# Patient Record
Sex: MALE | URBAN - METROPOLITAN AREA
[De-identification: names, ages, dates, MRNs, and addresses within clinical notes are randomized per-mention and may not be internally consistent; named-entity substitution may affect disease eponyms.]

---

## 2019-09-25 ENCOUNTER — APPOINTMENT (OUTPATIENT)
Dept: URBAN - METROPOLITAN AREA CLINIC 232 | Age: 59
Setting detail: DERMATOLOGY
End: 2019-09-26

## 2019-09-25 DIAGNOSIS — L40.0 PSORIASIS VULGARIS: ICD-10-CM

## 2019-09-25 PROCEDURE — OTHER ADDITIONAL NOTES: OTHER

## 2019-09-25 PROCEDURE — OTHER PPD: OTHER

## 2019-09-25 PROCEDURE — 86580 TB INTRADERMAL TEST: CPT

## 2019-09-25 PROCEDURE — 99214 OFFICE O/P EST MOD 30 MIN: CPT

## 2019-09-25 PROCEDURE — OTHER COUNSELING: OTHER

## 2019-09-25 PROCEDURE — OTHER PRESCRIPTION: OTHER

## 2019-09-25 RX ORDER — CLOBETASOL PROPIONATE 0.5 MG/ML
SOLUTION TOPICAL
Qty: 50 | Refills: 0 | Status: ERX | COMMUNITY
Start: 2019-09-25

## 2019-09-25 ASSESSMENT — LOCATION SIMPLE DESCRIPTION DERM
LOCATION SIMPLE: GENITALIA
LOCATION SIMPLE: RIGHT ELBOW
LOCATION SIMPLE: RIGHT KNEE
LOCATION SIMPLE: LEFT FOREARM
LOCATION SIMPLE: LEFT ELBOW
LOCATION SIMPLE: LEFT HAND
LOCATION SIMPLE: RIGHT UPPER BACK
LOCATION SIMPLE: RIGHT PRETIBIAL REGION
LOCATION SIMPLE: PENIS
LOCATION SIMPLE: RIGHT HAND
LOCATION SIMPLE: LEFT KNEE
LOCATION SIMPLE: POSTERIOR SCALP
LOCATION SIMPLE: LEFT PRETIBIAL REGION

## 2019-09-25 ASSESSMENT — LOCATION ZONE DERM
LOCATION ZONE: PENIS
LOCATION ZONE: ARM
LOCATION ZONE: TRUNK
LOCATION ZONE: SCALP
LOCATION ZONE: HAND
LOCATION ZONE: GENITALIA
LOCATION ZONE: LEG

## 2019-09-25 ASSESSMENT — LOCATION DETAILED DESCRIPTION DERM
LOCATION DETAILED: RIGHT DISTAL PRETIBIAL REGION
LOCATION DETAILED: RIGHT KNEE
LOCATION DETAILED: POSTERIOR MID-PARIETAL SCALP
LOCATION DETAILED: LEFT ULNAR DORSAL HAND
LOCATION DETAILED: LEFT PROXIMAL PRETIBIAL REGION
LOCATION DETAILED: LEFT ELBOW
LOCATION DETAILED: RIGHT ELBOW
LOCATION DETAILED: LEFT KNEE
LOCATION DETAILED: RIGHT DORSAL SHAFT OF PENIS
LOCATION DETAILED: RIGHT MEDIAL UPPER BACK
LOCATION DETAILED: LEFT VENTRAL PROXIMAL FOREARM
LOCATION DETAILED: RIGHT ULNAR DORSAL HAND
LOCATION DETAILED: GENITALIA

## 2019-09-25 NOTE — PROCEDURE: ADDITIONAL NOTES
Additional Notes: Previous tx recap:\\nUVB light at home pt uses on occasion, some improvement\\nH/O daily beer use so no MTX\\nHumira 40mg qowk used [id number] but had some flaring and joint pain remained\\nEnbrel 50mg 2x/wk 8654-9151, eventually flared skin \\nStelara 6924-8764, good results with psoriasis but pt believes caused gout and flared again when re-challenged\\nTaltz 0336-6118 (3mos tx) Pt felt flared gout to d/c'd\\nEnbrel 4/2019 to present, no gout flare but also taking allopurinol however skin flaring-scalp, face, elbows, hands, knees, genitals, feet\\n\\nPt using clobetasol to scalp, trunk, ext and TAC 0.025% cream intermittently.\\n\\nExplained I will discuss with Dr BENNETT and the biologics coordinator to determine next best option based on cost. Ht 5'8\", wt 215lbs\\n\\nPPD placed left ventral forearm. Pt will have read in 2 days by PCP and send results to the office. Additional Notes: Previous tx recap:\\nUVB light at home pt uses on occasion, some improvement\\nH/O daily beer use so no MTX\\nHumira 40mg qowk used 3268-9324 but had some flaring and joint pain remained\\nEnbrel 50mg 2x/wk [id number], eventually flared skin \\nStelara 1183-7055, good results with psoriasis but pt believes caused gout and flared again when re-challenged\\nTaltz 8012-1896 (3mos tx) Pt felt flared gout to d/c'd\\nEnbrel 4/2019 to present, no gout flare but also taking allopurinol however skin flaring-scalp, face, elbows, hands, knees, genitals, feet\\n\\nPt using clobetasol to scalp, trunk, ext and TAC 0.025% cream intermittently.\\n\\nExplained I will discuss with Dr BENNETT and the biologics coordinator to determine next best option based on cost. Ht 5'8\", wt 215lbs\\n\\nPPD placed left ventral forearm. Pt will have read in 2 days by PCP and send results to the office.

## 2019-10-07 ENCOUNTER — APPOINTMENT (OUTPATIENT)
Dept: URBAN - METROPOLITAN AREA CLINIC 232 | Age: 59
Setting detail: DERMATOLOGY
End: 2019-10-07

## 2019-10-07 DIAGNOSIS — L40.0 PSORIASIS VULGARIS: ICD-10-CM

## 2019-10-07 PROCEDURE — OTHER PRESCRIPTION: OTHER

## 2019-10-07 PROCEDURE — OTHER ADDITIONAL NOTES: OTHER

## 2019-10-07 RX ORDER — ETANERCEPT 50 MG/ML
SOLUTION SUBCUTANEOUS
Qty: 4 | Refills: 5 | Status: ERX | COMMUNITY
Start: 2019-10-07

## 2019-10-07 NOTE — PROCEDURE: ADDITIONAL NOTES
Detail Level: Zone
Additional Notes: Pt has had to failure with Enbrel twice weekly and I suspect will be lesser benefit at maintenance dose. Will pursue Skyrizi since it is most likely to be covered or have pt assistance. Await PPD reading to send Skyrizi rx

## 2019-10-09 ENCOUNTER — RX ONLY (RX ONLY)
Age: 59
End: 2019-10-09

## 2019-10-09 RX ORDER — BETAMETHASONE DIPROPIONATE 0.5 MG/ML
LOTION, AUGMENTED TOPICAL
Qty: 1 | Refills: 1 | Status: ERX | COMMUNITY
Start: 2019-10-09

## 2019-10-09 RX ORDER — BETAMETHASONE DIPROPIONATE 0.5 MG/ML
LOTION TOPICAL
Qty: 1 | Refills: 1 | Status: CANCELLED
Stop reason: ALTCHOICE

## 2019-10-28 ENCOUNTER — APPOINTMENT (OUTPATIENT)
Dept: URBAN - METROPOLITAN AREA CLINIC 232 | Age: 59
Setting detail: DERMATOLOGY
End: 2019-10-28

## 2019-10-29 ENCOUNTER — APPOINTMENT (OUTPATIENT)
Dept: URBAN - METROPOLITAN AREA CLINIC 232 | Age: 59
Setting detail: DERMATOLOGY
End: 2019-10-29

## 2019-10-29 DIAGNOSIS — L40.0 PSORIASIS VULGARIS: ICD-10-CM

## 2019-10-29 PROCEDURE — OTHER LAB REPORTS REVIEWED: OTHER

## 2019-10-29 PROCEDURE — OTHER ADDITIONAL NOTES: OTHER

## 2019-10-29 NOTE — PROCEDURE: ADDITIONAL NOTES
Additional Notes: Await authorization for Skyrizi\\nPt was notified of the above.
Detail Level: Detailed

## 2020-08-12 ENCOUNTER — RX ONLY (RX ONLY)
Age: 60
End: 2020-08-12

## 2020-08-12 RX ORDER — RISANKIZUMAB-RZAA 75 MG/0.83
KIT SUBCUTANEOUS
Qty: 1 | Refills: 0 | Status: ERX

## 2020-08-12 RX ORDER — RISANKIZUMAB-RZAA 75 MG/0.83
KIT SUBCUTANEOUS
Qty: 1 | Refills: 5 | Status: ERX | COMMUNITY
Start: 2020-08-12

## 2020-12-04 ENCOUNTER — RX ONLY (RX ONLY)
Age: 60
End: 2020-12-04

## 2020-12-04 RX ORDER — TRIAMCINOLONE ACETONIDE 0.25 MG/G
CREAM TOPICAL
Qty: 1 | Refills: 1 | Status: ERX | COMMUNITY
Start: 2020-12-04

## 2021-04-26 ENCOUNTER — APPOINTMENT (OUTPATIENT)
Dept: URBAN - METROPOLITAN AREA CLINIC 232 | Age: 61
Setting detail: DERMATOLOGY
End: 2021-04-26

## 2021-04-26 DIAGNOSIS — L40.0 PSORIASIS VULGARIS: ICD-10-CM

## 2021-04-26 PROCEDURE — OTHER SKYRIZI MONITORING: OTHER

## 2021-04-26 PROCEDURE — 99214 OFFICE O/P EST MOD 30 MIN: CPT

## 2021-04-26 PROCEDURE — OTHER PPD: OTHER

## 2021-04-26 PROCEDURE — OTHER COUNSELING: OTHER

## 2021-04-26 PROCEDURE — 86580 TB INTRADERMAL TEST: CPT

## 2021-04-26 PROCEDURE — OTHER PRESCRIPTION: OTHER

## 2021-04-26 PROCEDURE — OTHER TREATMENT REGIMEN: OTHER

## 2021-04-26 RX ORDER — CLOBETASOL PROPIONATE 0.5 MG/ML
SOLUTION TOPICAL
Qty: 1 | Refills: 5 | Status: ERX

## 2021-04-26 RX ORDER — RISANKIZUMAB-RZAA 75 MG/0.83
KIT SUBCUTANEOUS
Qty: 1 | Refills: 11 | Status: ERX

## 2021-04-26 RX ORDER — TRIAMCINOLONE ACETONIDE 0.25 MG/G
CREAM TOPICAL
Qty: 1 | Refills: 2 | Status: ERX | COMMUNITY
Start: 2021-04-26

## 2021-04-26 ASSESSMENT — LOCATION DETAILED DESCRIPTION DERM
LOCATION DETAILED: LEFT ULNAR DORSAL HAND
LOCATION DETAILED: RIGHT MEDIAL FOREHEAD
LOCATION DETAILED: LEFT PROXIMAL PRETIBIAL REGION
LOCATION DETAILED: RIGHT ELBOW
LOCATION DETAILED: LEFT CAVUM CONCHA
LOCATION DETAILED: LEFT KNEE
LOCATION DETAILED: RIGHT KNEE
LOCATION DETAILED: RIGHT RADIAL DORSAL HAND
LOCATION DETAILED: RIGHT PROXIMAL PRETIBIAL REGION
LOCATION DETAILED: RIGHT CAVUM CONCHA
LOCATION DETAILED: RIGHT VENTRAL PROXIMAL FOREARM
LOCATION DETAILED: LEFT SUPERIOR PARIETAL SCALP
LOCATION DETAILED: LEFT ELBOW

## 2021-04-26 ASSESSMENT — LOCATION ZONE DERM
LOCATION ZONE: EAR
LOCATION ZONE: HAND
LOCATION ZONE: SCALP
LOCATION ZONE: FACE
LOCATION ZONE: LEG
LOCATION ZONE: ARM

## 2021-04-26 ASSESSMENT — LOCATION SIMPLE DESCRIPTION DERM
LOCATION SIMPLE: LEFT KNEE
LOCATION SIMPLE: SCALP
LOCATION SIMPLE: RIGHT PRETIBIAL REGION
LOCATION SIMPLE: RIGHT HAND
LOCATION SIMPLE: LEFT PRETIBIAL REGION
LOCATION SIMPLE: LEFT EAR
LOCATION SIMPLE: LEFT ELBOW
LOCATION SIMPLE: LEFT HAND
LOCATION SIMPLE: RIGHT ELBOW
LOCATION SIMPLE: RIGHT EAR
LOCATION SIMPLE: RIGHT FOREARM
LOCATION SIMPLE: RIGHT FOREHEAD
LOCATION SIMPLE: RIGHT KNEE

## 2021-04-26 NOTE — PROCEDURE: TREATMENT REGIMEN
Action 1: Continue
Continue Regimen: Skyrizi 2 syringes q 12 weeks\\nTriamcinolone 0.025% cream bid to sensitive skin areas for 2 week intervals\\nClobetasol solution to scalp, ears, extremities bid for 2 week intervals
Detail Level: Zone

## 2021-09-01 ENCOUNTER — RX ONLY (RX ONLY)
Age: 61
End: 2021-09-01

## 2021-09-01 RX ORDER — RISANKIZUMAB-RZAA 150 MG/ML
INJECTION SUBCUTANEOUS
Qty: 1 | Refills: 3 | Status: ERX | COMMUNITY
Start: 2021-08-31

## 2022-01-20 ENCOUNTER — RX ONLY (RX ONLY)
Age: 62
End: 2022-01-20

## 2022-01-20 RX ORDER — TRIAMCINOLONE ACETONIDE 5 MG/G
CREAM TOPICAL
Qty: 45 | Refills: 3 | Status: ERX | COMMUNITY
Start: 2022-01-20

## 2022-01-25 NOTE — PROCEDURE: SKYRIZI MONITORING
[Time Spent: ___ minutes] : I have spent [unfilled] minutes of time on the encounter. Comments: Pt is not interested in moving to an alternative medication. The Skyrizi is keeping his psoriasis 70-80% clear and he has no side effects with the med so he would like to continue. He had a PPD placed today and will have his PCP read it. Pt knows to have his PCP fax the result to this office. Pt is due for a new prior auth and gets his Skyrizi thru [facility]. They are willing to complete the prior auth application so his clinic notes will be faxed to them. Reminded pt he needs to be see annually to avoid lapse in his refills and prior auth since he was last seen 10/2019. Comments: Pt is not interested in moving to an alternative medication. The Skyrizi is keeping his psoriasis 70-80% clear and he has no side effects with the med so he would like to continue. He had a PPD placed today and will have his PCP read it. Pt knows to have his PCP fax the result to this office. Pt is due for a new prior auth and gets his Skyrizi thru [facility]. They are willing to complete the prior auth application so his clinic notes will be faxed to them. Reminded pt he needs to be see annually to avoid lapse in his refills and prior auth since he was last seen 10/2019.

## 2022-05-03 ENCOUNTER — RX ONLY (RX ONLY)
Age: 62
End: 2022-05-03

## 2022-05-03 RX ORDER — TRIAMCINOLONE ACETONIDE 5 MG/G
CREAM TOPICAL
Qty: 45 | Refills: 3 | Status: ERX

## 2022-05-18 ENCOUNTER — APPOINTMENT (OUTPATIENT)
Dept: URBAN - METROPOLITAN AREA CLINIC 232 | Age: 62
Setting detail: DERMATOLOGY
End: 2022-05-19

## 2022-05-18 DIAGNOSIS — L40.0 PSORIASIS VULGARIS: ICD-10-CM

## 2022-05-18 PROCEDURE — OTHER MIPS QUALITY: OTHER

## 2022-05-18 PROCEDURE — OTHER SKYRIZI MONITORING: OTHER

## 2022-05-18 PROCEDURE — OTHER ORDER TESTS: OTHER

## 2022-05-18 PROCEDURE — 99214 OFFICE O/P EST MOD 30 MIN: CPT

## 2022-05-18 PROCEDURE — OTHER COUNSELING: OTHER

## 2022-05-18 PROCEDURE — OTHER TREATMENT REGIMEN: OTHER

## 2022-05-18 PROCEDURE — OTHER PRESCRIPTION: OTHER

## 2022-05-18 RX ORDER — CALCIPOTRIENE 0.05 MG/G
CREAM TOPICAL
Qty: 60 | Refills: 11 | Status: ERX | COMMUNITY
Start: 2022-05-18

## 2022-05-18 RX ORDER — CLOBETASOL PROPIONATE 0.5 MG/ML
SOLUTION TOPICAL
Qty: 50 | Refills: 3 | Status: ERX

## 2022-05-18 RX ORDER — BETAMETHASONE DIPROPIONATE 0.5 MG/G
CREAM, AUGMENTED TOPICAL
Qty: 50 | Refills: 3 | Status: ERX | COMMUNITY
Start: 2022-05-18

## 2022-05-18 ASSESSMENT — LOCATION DETAILED DESCRIPTION DERM
LOCATION DETAILED: LEFT ULNAR DORSAL HAND
LOCATION DETAILED: LEFT ELBOW
LOCATION DETAILED: LEFT KNEE
LOCATION DETAILED: LEFT PROXIMAL PRETIBIAL REGION
LOCATION DETAILED: RIGHT KNEE
LOCATION DETAILED: LEFT SUPERIOR PARIETAL SCALP
LOCATION DETAILED: RIGHT ELBOW
LOCATION DETAILED: RIGHT PROXIMAL PRETIBIAL REGION
LOCATION DETAILED: RIGHT RADIAL DORSAL HAND

## 2022-05-18 ASSESSMENT — LOCATION ZONE DERM
LOCATION ZONE: SCALP
LOCATION ZONE: LEG
LOCATION ZONE: ARM
LOCATION ZONE: HAND

## 2022-05-18 ASSESSMENT — LOCATION SIMPLE DESCRIPTION DERM
LOCATION SIMPLE: RIGHT HAND
LOCATION SIMPLE: RIGHT KNEE
LOCATION SIMPLE: SCALP
LOCATION SIMPLE: RIGHT PRETIBIAL REGION
LOCATION SIMPLE: LEFT PRETIBIAL REGION
LOCATION SIMPLE: LEFT KNEE
LOCATION SIMPLE: LEFT HAND
LOCATION SIMPLE: LEFT ELBOW
LOCATION SIMPLE: RIGHT ELBOW

## 2022-05-18 ASSESSMENT — BSA PSORIASIS: % BODY COVERED IN PSORIASIS: 3

## 2022-05-18 NOTE — PROCEDURE: TREATMENT REGIMEN
Action 3: Continue
Detail Level: Zone
Continue Regimen: Skyrizi 150mg syringe sub q every 12 weeks\\nAug Betamethasone cream bid with occlusion to hands up to 2 week duration\\nClobetasol solution to scalp qd prn up to 2 week duration\\nTriamcinolone 0.025% cream bid prn up to 2 week intervals in genital area
Start Regimen: Calcipotriene cream bid when off steroids-may be used with occlusion

## 2022-05-18 NOTE — PROCEDURE: SKYRIZI MONITORING
Comments: Pt started Skyrizi 11/2019. He uses [facility] as his specialty pharmacy and has prior auth approved thru 4/25/2024. Pt is not interested in moving to an alternative medication despite flaring on hands, scalp, elbows and knees. The Karen is keeping his psoriasis 70-80% clear and he has no side effects with the med so he would like to continue. He prefers the syringes. Comments: Pt started Skyrizi 11/2019. He uses [facility] as his specialty pharmacy and has prior auth approved thru 4/25/2024. Pt is not interested in moving to an alternative medication despite flaring on hands, scalp, elbows and knees. The Karen is keeping his psoriasis 70-80% clear and he has no side effects with the med so he would like to continue. He prefers the syringes.

## 2022-08-16 ENCOUNTER — RX ONLY (RX ONLY)
Age: 62
End: 2022-08-16

## 2022-08-16 RX ORDER — RISANKIZUMAB-RZAA 150 MG/ML
INJECTION SUBCUTANEOUS
Qty: 1 | Refills: 3 | Status: ERX

## 2022-11-02 ENCOUNTER — RX ONLY (RX ONLY)
Age: 62
End: 2022-11-02

## 2022-11-02 RX ORDER — BETAMETHASONE DIPROPIONATE 0.5 MG/G
CREAM, AUGMENTED TOPICAL
Qty: 50 | Refills: 3 | Status: ERX

## 2022-11-02 RX ORDER — TRIAMCINOLONE ACETONIDE 0.25 MG/G
CREAM TOPICAL
Qty: 454 | Refills: 1 | Status: ERX

## 2023-04-28 ENCOUNTER — RX ONLY (RX ONLY)
Age: 63
End: 2023-04-28

## 2023-04-28 RX ORDER — TRIAMCINOLONE ACETONIDE 5 MG/G
CREAM TOPICAL
Qty: 45 | Refills: 3 | Status: ERX | COMMUNITY
Start: 2023-04-28

## 2024-08-15 ENCOUNTER — APPOINTMENT (OUTPATIENT)
Dept: URBAN - METROPOLITAN AREA CLINIC 232 | Age: 64
Setting detail: DERMATOLOGY
End: 2024-08-16

## 2024-08-15 VITALS — WEIGHT: 197 LBS

## 2024-08-15 DIAGNOSIS — L40.0 PSORIASIS VULGARIS: ICD-10-CM

## 2024-08-15 PROCEDURE — OTHER COUNSELING: OTHER

## 2024-08-15 PROCEDURE — OTHER TREATMENT REGIMEN: OTHER

## 2024-08-15 PROCEDURE — OTHER ORDER TESTS: OTHER

## 2024-08-15 PROCEDURE — OTHER MIPS QUALITY: OTHER

## 2024-08-15 PROCEDURE — OTHER SKYRIZI INITIATION: OTHER

## 2024-08-15 PROCEDURE — OTHER PRESCRIPTION: OTHER

## 2024-08-15 PROCEDURE — 99214 OFFICE O/P EST MOD 30 MIN: CPT

## 2024-08-15 RX ORDER — TRIAMCINOLONE ACETONIDE 0.25 MG/G
CREAM TOPICAL
Qty: 454 | Refills: 2 | Status: ERX | COMMUNITY
Start: 2024-08-15

## 2024-08-15 RX ORDER — TRIAMCINOLONE ACETONIDE 1 MG/G
CREAM TOPICAL
Qty: 453.6 | Refills: 2 | Status: ERX | COMMUNITY
Start: 2024-08-15

## 2024-08-15 RX ORDER — BETAMETHASONE DIPROPIONATE 0.5 MG/ML
LOTION TOPICAL
Qty: 180 | Refills: 3 | Status: ERX | COMMUNITY
Start: 2024-08-15

## 2024-08-15 RX ORDER — CALCIPOTRIENE 50 UG/G
CREAM TOPICAL
Qty: 360 | Refills: 3 | Status: ERX | COMMUNITY
Start: 2024-08-15

## 2024-08-15 RX ORDER — BETAMETHASONE DIPROPIONATE 0.5 MG/G
CREAM, AUGMENTED TOPICAL
Qty: 150 | Refills: 3 | Status: ERX | COMMUNITY
Start: 2024-08-15

## 2024-08-15 ASSESSMENT — LOCATION ZONE DERM
LOCATION ZONE: HAND
LOCATION ZONE: TRUNK
LOCATION ZONE: LEG
LOCATION ZONE: SCALP
LOCATION ZONE: FEET
LOCATION ZONE: ARM

## 2024-08-15 ASSESSMENT — PGA PSORIASIS: PGA PSORIASIS 2020: MODERATE

## 2024-08-15 ASSESSMENT — LOCATION SIMPLE DESCRIPTION DERM
LOCATION SIMPLE: RIGHT ELBOW
LOCATION SIMPLE: RIGHT THIGH
LOCATION SIMPLE: RIGHT PRETIBIAL REGION
LOCATION SIMPLE: LEFT HAND
LOCATION SIMPLE: LEFT PRETIBIAL REGION
LOCATION SIMPLE: GROIN
LOCATION SIMPLE: RIGHT FOOT
LOCATION SIMPLE: LEFT ELBOW
LOCATION SIMPLE: LEFT FOOT
LOCATION SIMPLE: LEFT KNEE
LOCATION SIMPLE: RIGHT HAND
LOCATION SIMPLE: RIGHT KNEE
LOCATION SIMPLE: SCALP

## 2024-08-15 ASSESSMENT — LOCATION DETAILED DESCRIPTION DERM
LOCATION DETAILED: LEFT ELBOW
LOCATION DETAILED: LEFT DORSAL FOOT
LOCATION DETAILED: RIGHT DORSAL FOOT
LOCATION DETAILED: RIGHT ELBOW
LOCATION DETAILED: RIGHT RADIAL DORSAL HAND
LOCATION DETAILED: RIGHT KNEE
LOCATION DETAILED: RIGHT ANTERIOR PROXIMAL THIGH
LOCATION DETAILED: SUPRAPUBIC SKIN
LOCATION DETAILED: RIGHT PROXIMAL PRETIBIAL REGION
LOCATION DETAILED: LEFT PROXIMAL PRETIBIAL REGION
LOCATION DETAILED: LEFT SUPERIOR PARIETAL SCALP
LOCATION DETAILED: LEFT KNEE
LOCATION DETAILED: LEFT ULNAR DORSAL HAND

## 2024-08-15 ASSESSMENT — BSA PSORIASIS: % BODY COVERED IN PSORIASIS: 12

## 2024-08-15 ASSESSMENT — ITCH NUMERIC RATING SCALE: HOW SEVERE IS YOUR ITCHING?: 8

## 2024-08-15 NOTE — PROCEDURE: ORDER TESTS
Expected Date Of Service: 08/15/2024
Lab Facility: [id number]
Billing Type: Third-Party Bill
Bill For Surgical Tray: no
Performing Laboratory: [id number]

## 2024-08-15 NOTE — PROCEDURE: TREATMENT REGIMEN
Action 3: Continue
Detail Level: Zone
Continue Regimen: Aug Betamethasone cream bid with occlusion to hands up to 2 week duration\\nTriamcinolone 0.025% cream bid prn up to 2 week intervals in genital area\\nCalcipotriene cream bid when off steroids-may be used with occlusion
Initiate Regimen: Skyrizi 150mg syringe sub q then 150mg week 4 then 150mg every 12 weeks thereafter

## 2024-08-15 NOTE — PROCEDURE: SKYRIZI INITIATION
Is Methotrexate Contraindicated?: No
Initial Quantiferon Gold (Optional): 6/13/22
Include Weight Question: Yes - Pounds
Comments: This is a restart. Pt has been off med for about a year.
Detail Level: Zone
Pregnancy And Lactation Warning Text: The risk during pregnancy and breastfeeding is uncertain with this medication.
Skyrizi Dosing: 150 mg SC week 0 and week 4 then every 12 weeks thereafter
Skyrizi Monitoring Guidelines: A yearly test for tuberculosis is required while taking Skyrizi.
Diagnosis (Required): Psoriasis

## 2024-08-16 RX ORDER — RISANKIZUMAB-RZAA 150 MG/ML
INJECTION SUBCUTANEOUS
Qty: 1 | Refills: 3 | Status: ERX | COMMUNITY
Start: 2024-08-16

## 2024-08-16 RX ORDER — RISANKIZUMAB-RZAA 150 MG/ML
INJECTION SUBCUTANEOUS
Qty: 1 | Refills: 1 | Status: ERX

## 2024-09-25 RX ORDER — CALCIPOTRIENE 50 UG/G
CREAM TOPICAL
Qty: 360 | Refills: 5 | Status: ERX